# Patient Record
Sex: MALE
[De-identification: names, ages, dates, MRNs, and addresses within clinical notes are randomized per-mention and may not be internally consistent; named-entity substitution may affect disease eponyms.]

---

## 2024-07-22 PROBLEM — Z00.00 ENCOUNTER FOR PREVENTIVE HEALTH EXAMINATION: Status: ACTIVE | Noted: 2024-07-22

## 2024-07-23 ENCOUNTER — APPOINTMENT (OUTPATIENT)
Dept: NEUROLOGY | Facility: CLINIC | Age: 74
End: 2024-07-23

## 2024-07-23 NOTE — HISTORY OF PRESENT ILLNESS
[FreeTextEntry1] : 6/3 [facility]  74 year old male with reported history of HTN not taking maintenance medications, no other reports past medical history, not on AC/AP coming in with sudden onset left sided weakness. LKW 1500, patient at work ([employer]), he was sitting in his car when he noticed he was unable to get out, he felt his left side was weak. EMS arrived noting left sided weakness and right gaze preference. On arrival code stroke called 1555, stroke team arrived 1557, NIHSS 6 for subtle Left NLFF (right activated more), subtle slurred speech, LUE some effort and LLE drift, extinction to double sided stimulation with LT however can identify left arm and left. CT head completed at 1605, reviewed by neuro PA 1607, demonstrating R thalamocapsular hemorrhage with ring of vasogenic edema and mild midline shift at the level of the septum pellucidum measuring 4mm right to left. CTA without large vessel occlusion or significant stenosis. Not tNK candidate given hemorrhage on imaging and not EVT candidate given no LVO on CTA     64M hx of uncontrolled HTN p/w LHP, found to have Rt BG ICH ICH score 0 on admission, with interval increase in Rt BG ICH w/ IVH, CTH stable x 2. Course c/b by worsening L sided weakness, MRI today ICH stable but findings suspicious for ischemic CVA with hemorrhagic conversion vs. T2 shine through, also consider hypertensive hemorrhage.    - Neurochecks q4h - downgrade to 5S - Continuous telemetry  - Long-term BP goal normotensive < 130/80  - Repeat MRI brain WAW in 2-3 weeks, consider starting ASA 81mg at that time  - STAT CTH for any change or worsening in neurologic exam - LDL 99 - would continue high intensity statin - A1C 6.3% - f/u with PCP upon discharge for better BP control  - No AEDs - PT/OT/SLP evals - follow-up recommendations for safe dispo     4M hx of uncontrolled HTN p/w LHP, found to have Rt BG ICH with IVH, ICH score 0 on admission. Course c/b by worsening L sided weakness and increase in size of hemorrhage. MRI Brain WAWIC obtained while admitted to the hospital - there was concern for restricted diffusion vs. T2 shine through surrounding hemorrhage. 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    - Long-term BP goal normotensive < 130/80  - Hold Aspirin at this time - Would repeat MRI Brain WAWIC in 1 month to allow for further blood reabsorption - Follow-up with Neurology as an outpatient to f/u MRI results

## 2024-07-23 NOTE — DATA REVIEWED
[de-identified] : 6/20/24- MRI Brain: FINDINGS:  An evolving 2.5 x 2.3 x 3.4 cm (AP x LR x CC) (13:19 and 9:19-20) hematoma in the right thalamus is stable in size from 06/06/2024.  Surrounding vasogenic edema and mild regional mass effect is grossly stable.  There is nonspecific DWI signal abnormality within the hematoma.  There is no evidence of recent gland cerebral infarction.  There is no midline shift, central herniation and hydrocephalus.   Prominence of the ventricles compatible with mild age related structural changes.  Moderate patchy T2 FLAIR signal hyperintensity in the periventricular, deep and subcortical white matter of both cerebral hemispheres compatible with chronic microvascular ischemic disease.  There are chronic microhemorrhages in the ventral left frontal convexity versus the left insula, left thalamus, and in the raji.  There is chronic hemosiderin staining along the cerebellar folia, consistent with subarachnoid hemorrhage..  There is hemosiderin staining in the right lateral ventricle, compatible with old intraventricular blood products.  The paranasal sinuses and mastoids are grossly clear.   The calvarium, skull base and orbits appear within normal limits.   IMPRESSION:  Abnormal 2.5 x 2.3 x 3.4 cm right thalamic hemorrhage is stable in size from 06/06/2024.  There is nonspecific DWI signal abnormality within the hematoma.  No evidence of a recent bland cerebral infarction.  6/6/24-MRI Brain: Comparison made to previous CT head 6/4/2024  There is a 2.5 cm right thalamic T1 hyperintense and T2/FLAIR and susceptibility sequence low signal intensity hematoma with surrounding T2/FLAIR hyperintensity edema. There is a thin rim of hyperintensity surrounding right thalamic hematoma on diffusion sequence may indicate subendocardial ischemia and/or T2 shine through artifact. There is persistent mass effect with compression of third ventricle and mild midline shift to the left..There is no appreciable associated enhancement within the hematoma given limitations at T1 hyperintense shortening limiting evaluation for possible subtle areas of enhancement. There is mild intraventricular hemorrhage dependently within the occipital horns similar to recent CT scan. There is no hydrocephalus.  There is prominence of cortical sulci fissures and ventricles related to mild generalized volume loss. There are multiple foci of T2/FLAIR hyperintensity within the periventricular and subcortical white matter without associated enhancement suspicious for small vessel ischemic disease. There are 4 mm low signal focus within left aspect of raji on postcontrast T1 and susceptibility sequence with associated low signal on FLAIR and T2 sequence with punctate central high signal focus on T2 sequence. The findings suggestive of a old microhemorrhage rather than cavernoma given T1 hypointensity. There is a small old microhemorrhage within right cerebellar vermis on susceptibility sequence.   There is no area parenchymal enhancing lesion. There is no evidence of AVM given some limitations due to right thalamic hematoma and mass effect.  The distal internal carotid and vertebral basilar artery flow-voids are preserved.  There is no pathologic marrow placement. The ocular globes are unremarkable. There is an old left medial orbital wall fracture. There is no acute sinusitis.     IMPRESSION:  2.5 cm right thalamic hematoma with associated surrounding edema and mild mass effect. Thin rim of right thalamic perihematoma hyperintensity on diffusion sequence may indicate a component of ischemia surrounding hematoma or T2 shine through artifact.. No associated right thalamic abnormal enhancement or vascular malformation given some limitations due to T1 hyperintense methemoglobin precontrast study. Recommend interval follow-up to resolution.  Small degree of intraventricular hemorrhage within occipital horns. No hydrocephalus.  Moderate white matter small vessel ischemic disease.  [de-identified] : 6/3/24-CT Head, CTA Head/ Neck: FINDINGS:  A parenchymal hematoma is noted in the right thalamocapsular region, measuring 25 x 21 x 29 mm. There is a ring of vasogenic edema surrounding the lesion. There is mild midline shift at the level of the septum pellucidum measuring 4 mm right to left. There is no herniation. The appearance is consistent with hypertensive hemorrhage. Follow-up to resolution is recommended.  Mild-to-moderate generalized cerebral volume loss, with distention of the sulci and concomitant ex-vacuo ventricular dilatation. Mild-to-moderate nonspecific low attenuation in the periventricular and subcortical white matter, likely due to small vessel disease. No CT evidence of acute ischemia, although MRI with DWI would be more sensitive.  The right vertebral artery is dominant.  On the right, the ICA, ECA, CCA, and brachiocephalic artery are patent. The left, the ICA, ECA, CCA patent. The vertebrals and subclavian arteries are patent. No arterial dissection or ulcerated plaque.  Intracranially, there is no vascularity associated with the clot in the right thalamocapsular region, without extravasation. There is minimal plaque at the right C6 and left C4 ICA segments, without significant associated narrowing. There is an infundibulum at the origin of the right PCOM.  No intracranial aneurysms or large vessel occlusions. No large feeding arteries or draining veins. The ACAs, MCAs, PCAs, and carotid siphons are otherwise negative. The basilar artery and V4 vertebral segments are otherwise negative. Abnormalities of  vessels and distal intracranial branches are beyond the resolution of this technique, and catheter angiography would be more sensitive.  The dural venous sinuses are grossly patent, although this examination wasn't optimized to evaluate the cerebral veins. Mild degenerative changes are noted in the cervical spine. No gross evidence of an acute fracture, although this examination wasn't optimized to evaluate the spine.  The visualized sinuses and mastoids are clear. There is a chronic fracture of the left lamina papyracea. Limited views of the orbits and visualized soft tissues of the neck, face, scalp, skull base, and calvarium are otherwise unremarkable.   IMPRESSION:   1.  Parenchymal hemorrhage in the right thalamocapsular region, without extravasation. 2.  No retrievable clot or hemodynamically significant stenoses. 3.  Chronic fracture of the left lamina papyracea.